# Patient Record
Sex: FEMALE | Race: WHITE | Employment: UNEMPLOYED | URBAN - METROPOLITAN AREA
[De-identification: names, ages, dates, MRNs, and addresses within clinical notes are randomized per-mention and may not be internally consistent; named-entity substitution may affect disease eponyms.]

---
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
## 2025-05-09 NOTE — PROGRESS NOTES
Nurse visit Depo    Date of service: 2018    Myles Roldan  Is a 11 y.o. single female    PT's PCP is: Anup Gage MD     : 2005                                             Subjective:       No LMP recorded. Patient has had an injection. Allergies: Patient has no known allergies. Chief Complaint   Patient presents with    Injections     Depo injection - office supplied - right arm       Last yearly: 2017    Last pap: NA     Last HPV:  ( if due for pap schedule pap)    LAST DEPO: 18 ( if past 13 weeks and not on period please talk with provider)    PE:  Vital Signs  Blood pressure 114/70, weight 166 lb 12.8 oz (75.7 kg), not currently breastfeeding. Labs:    No results found for this visit on 18. Yes  PT denies fever, chills, nausea and vomiting                            Assessment and Plan          Diagnosis Orders   1.  Irregular bleeding  medroxyPROGESTERone (DEPO-PROVERA) injection 150 mg         Depo was: Office supplied      NURSE: JOE Don Detail Level: Zone